# Patient Record
Sex: FEMALE | Race: BLACK OR AFRICAN AMERICAN | URBAN - METROPOLITAN AREA
[De-identification: names, ages, dates, MRNs, and addresses within clinical notes are randomized per-mention and may not be internally consistent; named-entity substitution may affect disease eponyms.]

---
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
## 2023-07-14 NOTE — PROCEDURE: MEDICATION COUNSELING
Pt with no physical signs of muscle wasting or fat depletion as noted Pt with no physical signs of muscle wasting or fat depletion noted Use Enhanced Medication Counseling?: No

## 2025-01-28 NOTE — PROCEDURE: MEDICATION COUNSELING
----- Message from Summer sent at 1/28/2025  9:03 AM CST -----  Contact: Lj  Girish called asking for advice about his first schedule appointment he might needs to reschedule. Please call patient at [phone]. Thanks!   Acitretin Counseling:  I discussed with the patient the risks of acitretin including but not limited to hair loss, dry lips/skin/eyes, liver damage, hyperlipidemia, depression/suicidal ideation, photosensitivity.  Serious rare side effects can include but are not limited to pancreatitis, pseudotumor cerebri, bony changes, clot formation/stroke/heart attack.  Patient understands that alcohol is contraindicated since it can result in liver toxicity and significantly prolong the elimination of the drug by many years.